# Patient Record
Sex: FEMALE | URBAN - METROPOLITAN AREA
[De-identification: names, ages, dates, MRNs, and addresses within clinical notes are randomized per-mention and may not be internally consistent; named-entity substitution may affect disease eponyms.]

---

## 2023-07-19 ENCOUNTER — APPOINTMENT (RX ONLY)
Dept: URBAN - METROPOLITAN AREA CLINIC 236 | Facility: CLINIC | Age: 43
Setting detail: DERMATOLOGY
End: 2023-07-19

## 2023-07-19 DIAGNOSIS — Z41.9 ENCOUNTER FOR PROCEDURE FOR PURPOSES OTHER THAN REMEDYING HEALTH STATE, UNSPECIFIED: ICD-10-CM

## 2023-07-19 PROCEDURE — ? DYSPORT

## 2023-07-19 NOTE — PROCEDURE: DYSPORT
Price (Use Numbers Only, No Special Characters Or $): 791 Price (Use Numbers Only, No Special Characters Or $): 135

## 2023-07-19 NOTE — PROCEDURE: DYSPORT
Glabellar Complex Units: 60 Debridement Text: The wound edges were debrided prior to proceeding with the closure to facilitate wound healing.

## 2025-04-30 ENCOUNTER — APPOINTMENT (OUTPATIENT)
Dept: URBAN - METROPOLITAN AREA CLINIC 236 | Facility: CLINIC | Age: 45
Setting detail: DERMATOLOGY
End: 2025-04-30

## 2025-04-30 DIAGNOSIS — Z41.9 ENCOUNTER FOR PROCEDURE FOR PURPOSES OTHER THAN REMEDYING HEALTH STATE, UNSPECIFIED: ICD-10-CM

## 2025-04-30 PROCEDURE — ? COSMETIC CONSULTATION: IPL

## 2025-04-30 PROCEDURE — ? DYSPORT

## 2025-04-30 NOTE — PROCEDURE: DYSPORT
Show Glabellar Units: Yes
Additional Area 4 Units: 0
Show Mentalis Units: No
Lot #: [id number]
Forehead Units: 30
Additional Area 1 Location: [facility]
Dilution (U/0.1 Cc): 2.5
Glabellar Complex Units: 60
Expiration Date (Month Year): 10/26
Additional Area 2 Location: obicularis oris
Levator Labii Superioris Units: 18
Price (Use Numbers Only, No Special Characters Or $): 481
Detail Level: Detailed
Additional Area 5 Location: platysmal bands
Additional Area 3 Location: nasalis
Periorbital Skin Units: 72
Consent: Written consent obtained. Risks include but not limited to lid/brow ptosis, bruising, swelling, diplopia, temporary effect, incomplete chemical denervation.
Post-Care Instructions: Patient instructed to not lie down for 4 hours and limit physical activity for 24 hours.
Additional Area 4 Location: masseters